# Patient Record
Sex: FEMALE | URBAN - METROPOLITAN AREA
[De-identification: names, ages, dates, MRNs, and addresses within clinical notes are randomized per-mention and may not be internally consistent; named-entity substitution may affect disease eponyms.]

---

## 2021-01-04 ENCOUNTER — OFFICE VISIT (OUTPATIENT)
Dept: URBAN - METROPOLITAN AREA CLINIC 25 | Facility: CLINIC | Age: 76
End: 2021-01-04
Payer: COMMERCIAL

## 2021-01-04 DIAGNOSIS — H43.393 OTHER VITREOUS OPACITIES, BILATERAL: ICD-10-CM

## 2021-01-04 DIAGNOSIS — Z96.1 PRESENCE OF INTRAOCULAR LENS: ICD-10-CM

## 2021-01-04 DIAGNOSIS — D30.01: ICD-10-CM

## 2021-01-04 DIAGNOSIS — D31.22 BENIGN NEOPLASM OF LEFT RETINA: Primary | ICD-10-CM

## 2021-01-04 PROCEDURE — 99204 OFFICE O/P NEW MOD 45 MIN: CPT | Performed by: OPTOMETRIST

## 2021-01-04 PROCEDURE — 92134 CPTRZ OPH DX IMG PST SGM RTA: CPT | Performed by: OPTOMETRIST

## 2021-01-04 ASSESSMENT — INTRAOCULAR PRESSURE
OD: 13
OS: 18

## 2021-01-04 NOTE — IMPRESSION/PLAN
Impression: Other vitreous opacities, bilateral: H43.393. Plan: Pt edu. Monitor yearly. Updated SRx if desired.

## 2021-01-04 NOTE — IMPRESSION/PLAN
Impression: Other vitreous opacities, bilateral: H43.393. Plan:  There is no evidence of retinal pathology. All signs and risks of retinal detachment and tears were discussed in detail. Patient instructed to call the office immediately if any symptoms noted. Recommend the patient return to office for follow up.

## 2023-06-01 ENCOUNTER — OFFICE VISIT (OUTPATIENT)
Dept: URBAN - METROPOLITAN AREA CLINIC 26 | Facility: CLINIC | Age: 78
End: 2023-06-01
Payer: COMMERCIAL

## 2023-06-01 DIAGNOSIS — H52.4 PRESBYOPIA: ICD-10-CM

## 2023-06-01 DIAGNOSIS — H04.123 DRY EYE SYNDROME OF BILATERAL LACRIMAL GLANDS: Primary | ICD-10-CM

## 2023-06-01 DIAGNOSIS — H53.19 OTHER SUBJECTIVE VISUAL DISTURBANCES: ICD-10-CM

## 2023-06-01 PROCEDURE — 92015 DETERMINE REFRACTIVE STATE: CPT | Performed by: OPTOMETRIST

## 2023-06-01 PROCEDURE — 92004 COMPRE OPH EXAM NEW PT 1/>: CPT | Performed by: OPTOMETRIST

## 2023-06-01 ASSESSMENT — KERATOMETRY
OD: 44.75
OS: 45.13

## 2023-06-01 ASSESSMENT — VISUAL ACUITY
OS: 20/25
OD: 20/20

## 2023-06-01 ASSESSMENT — INTRAOCULAR PRESSURE
OD: 12
OS: 14

## 2023-06-01 NOTE — IMPRESSION/PLAN
Impression: Other subjective visual disturbances: H53.19. 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   
Ocular health appears normal
recommend establishing care with neuro-  Ophthalmologist for work up and MRI

## 2025-07-24 ENCOUNTER — OFFICE VISIT (OUTPATIENT)
Dept: URBAN - METROPOLITAN AREA CLINIC 26 | Facility: CLINIC | Age: 80
End: 2025-07-24
Payer: COMMERCIAL

## 2025-07-24 DIAGNOSIS — H04.123 DRY EYE SYNDROME OF BILATERAL LACRIMAL GLANDS: ICD-10-CM

## 2025-07-24 DIAGNOSIS — H52.4 PRESBYOPIA: ICD-10-CM

## 2025-07-24 DIAGNOSIS — H57.10 OCULAR PAIN, UNSPECIFIED EYE: Primary | ICD-10-CM

## 2025-07-24 PROCEDURE — 92014 COMPRE OPH EXAM EST PT 1/>: CPT | Performed by: OPTOMETRIST

## 2025-07-24 ASSESSMENT — KERATOMETRY
OD: 44.50
OS: 42.63

## 2025-07-24 ASSESSMENT — INTRAOCULAR PRESSURE
OD: 16
OS: 18